# Patient Record
Sex: FEMALE | Race: WHITE | NOT HISPANIC OR LATINO | Employment: UNEMPLOYED | URBAN - NONMETROPOLITAN AREA
[De-identification: names, ages, dates, MRNs, and addresses within clinical notes are randomized per-mention and may not be internally consistent; named-entity substitution may affect disease eponyms.]

---

## 2017-02-22 ENCOUNTER — LAB REQUISITION (OUTPATIENT)
Dept: LAB | Facility: HOSPITAL | Age: 37
End: 2017-02-22

## 2017-02-22 DIAGNOSIS — N28.9 DISORDER OF KIDNEY AND URETER: ICD-10-CM

## 2017-02-22 LAB
BACTERIA UR QL AUTO: ABNORMAL /HPF
BILIRUB UR QL STRIP: NEGATIVE
CLARITY UR: CLEAR
COLOR UR: ABNORMAL
GLUCOSE UR STRIP-MCNC: NEGATIVE MG/DL
HGB UR QL STRIP.AUTO: NEGATIVE
HYALINE CASTS UR QL AUTO: ABNORMAL /LPF
KETONES UR QL STRIP: ABNORMAL
LEUKOCYTE ESTERASE UR QL STRIP.AUTO: NEGATIVE
NITRITE UR QL STRIP: POSITIVE
PH UR STRIP.AUTO: <=5 [PH] (ref 5–8)
PROT UR QL STRIP: NEGATIVE
RBC # UR: ABNORMAL /HPF
REF LAB TEST METHOD: ABNORMAL
SP GR UR STRIP: >=1.03 (ref 1–1.03)
SQUAMOUS #/AREA URNS HPF: ABNORMAL /HPF
UROBILINOGEN UR QL STRIP: ABNORMAL
WBC UR QL AUTO: ABNORMAL /HPF

## 2017-02-22 PROCEDURE — 87086 URINE CULTURE/COLONY COUNT: CPT | Performed by: NURSE PRACTITIONER

## 2017-02-22 PROCEDURE — 81001 URINALYSIS AUTO W/SCOPE: CPT | Performed by: NURSE PRACTITIONER

## 2017-02-24 LAB — BACTERIA SPEC AEROBE CULT: NORMAL

## 2017-05-10 ENCOUNTER — TELEPHONE (OUTPATIENT)
Dept: CARDIOLOGY | Facility: CLINIC | Age: 37
End: 2017-05-10

## 2017-05-15 ENCOUNTER — OFFICE VISIT (OUTPATIENT)
Dept: CARDIOLOGY | Facility: CLINIC | Age: 37
End: 2017-05-15

## 2017-05-15 VITALS
SYSTOLIC BLOOD PRESSURE: 114 MMHG | HEART RATE: 79 BPM | HEIGHT: 65 IN | OXYGEN SATURATION: 97 % | BODY MASS INDEX: 27.06 KG/M2 | WEIGHT: 162.4 LBS | DIASTOLIC BLOOD PRESSURE: 79 MMHG

## 2017-05-15 DIAGNOSIS — R07.9 CHEST PAIN IN ADULT: Primary | ICD-10-CM

## 2017-05-15 DIAGNOSIS — R00.2 PALPITATIONS: ICD-10-CM

## 2017-05-15 PROCEDURE — 93000 ELECTROCARDIOGRAM COMPLETE: CPT | Performed by: INTERNAL MEDICINE

## 2017-05-15 PROCEDURE — 99244 OFF/OP CNSLTJ NEW/EST MOD 40: CPT | Performed by: INTERNAL MEDICINE

## 2017-05-15 RX ORDER — HYDROCODONE BITARTRATE AND ACETAMINOPHEN 10; 325 MG/1; MG/1
TABLET ORAL
Refills: 0 | COMMUNITY
Start: 2017-05-08

## 2017-05-20 PROBLEM — R07.9 CHEST PAIN IN ADULT: Status: ACTIVE | Noted: 2017-05-20

## 2017-05-20 PROBLEM — R00.2 PALPITATIONS: Status: ACTIVE | Noted: 2017-05-20

## 2018-08-13 ENCOUNTER — HOSPITAL ENCOUNTER (EMERGENCY)
Facility: HOSPITAL | Age: 38
Discharge: HOME | End: 2018-08-13
Attending: FAMILY MEDICINE | Admitting: FAMILY MEDICINE

## 2018-08-13 ENCOUNTER — APPOINTMENT (OUTPATIENT)
Dept: GENERAL RADIOLOGY | Facility: HOSPITAL | Age: 38
End: 2018-08-13

## 2018-08-13 VITALS
HEART RATE: 67 BPM | DIASTOLIC BLOOD PRESSURE: 87 MMHG | SYSTOLIC BLOOD PRESSURE: 120 MMHG | BODY MASS INDEX: 25.49 KG/M2 | HEIGHT: 65 IN | RESPIRATION RATE: 16 BRPM | OXYGEN SATURATION: 99 % | WEIGHT: 153 LBS | TEMPERATURE: 98.2 F

## 2018-08-13 DIAGNOSIS — R07.9 CHEST PAIN IN ADULT: Primary | ICD-10-CM

## 2018-08-13 LAB
ALBUMIN SERPL-MCNC: 4.4 G/DL (ref 3.5–5)
ALBUMIN/GLOB SERPL: 1.6 G/DL (ref 1.5–2.5)
ALP SERPL-CCNC: 79 U/L (ref 35–104)
ALT SERPL W P-5'-P-CCNC: 25 U/L (ref 10–36)
ANION GAP SERPL CALCULATED.3IONS-SCNC: 3.6 MMOL/L (ref 3.6–11.2)
AST SERPL-CCNC: 27 U/L (ref 10–30)
BASOPHILS # BLD AUTO: 0.02 10*3/MM3 (ref 0–0.3)
BASOPHILS NFR BLD AUTO: 0.5 % (ref 0–2)
BILIRUB SERPL-MCNC: 0.5 MG/DL (ref 0.2–1.8)
BNP SERPL-MCNC: 20 PG/ML (ref 0–100)
BUN BLD-MCNC: 13 MG/DL (ref 7–21)
BUN/CREAT SERPL: 19.4 (ref 7–25)
CALCIUM SPEC-SCNC: 9.2 MG/DL (ref 7.7–10)
CHLORIDE SERPL-SCNC: 105 MMOL/L (ref 99–112)
CO2 SERPL-SCNC: 30.4 MMOL/L (ref 24.3–31.9)
CREAT BLD-MCNC: 0.67 MG/DL (ref 0.43–1.29)
DEPRECATED RDW RBC AUTO: 41.4 FL (ref 37–54)
EOSINOPHIL # BLD AUTO: 0.08 10*3/MM3 (ref 0–0.7)
EOSINOPHIL NFR BLD AUTO: 1.8 % (ref 0–5)
ERYTHROCYTE [DISTWIDTH] IN BLOOD BY AUTOMATED COUNT: 12.1 % (ref 11.5–14.5)
GFR SERPL CREATININE-BSD FRML MDRD: 97 ML/MIN/1.73
GLOBULIN UR ELPH-MCNC: 2.7 GM/DL
GLUCOSE BLD-MCNC: 93 MG/DL (ref 70–110)
HCT VFR BLD AUTO: 38.5 % (ref 37–47)
HGB BLD-MCNC: 12.7 G/DL (ref 12–16)
IMM GRANULOCYTES # BLD: 0.01 10*3/MM3 (ref 0–0.03)
IMM GRANULOCYTES NFR BLD: 0.2 % (ref 0–0.5)
LYMPHOCYTES # BLD AUTO: 1.58 10*3/MM3 (ref 1–3)
LYMPHOCYTES NFR BLD AUTO: 35.8 % (ref 21–51)
MCH RBC QN AUTO: 31.8 PG (ref 27–33)
MCHC RBC AUTO-ENTMCNC: 33 G/DL (ref 33–37)
MCV RBC AUTO: 96.3 FL (ref 80–94)
MONOCYTES # BLD AUTO: 0.38 10*3/MM3 (ref 0.1–0.9)
MONOCYTES NFR BLD AUTO: 8.6 % (ref 0–10)
NEUTROPHILS # BLD AUTO: 2.34 10*3/MM3 (ref 1.4–6.5)
NEUTROPHILS NFR BLD AUTO: 53.1 % (ref 30–70)
OSMOLALITY SERPL CALC.SUM OF ELEC: 277.3 MOSM/KG (ref 273–305)
PLATELET # BLD AUTO: 183 10*3/MM3 (ref 130–400)
PMV BLD AUTO: 9.7 FL (ref 6–10)
POTASSIUM BLD-SCNC: 3.7 MMOL/L (ref 3.5–5.3)
PROT SERPL-MCNC: 7.1 G/DL (ref 6–8)
RBC # BLD AUTO: 4 10*6/MM3 (ref 4.2–5.4)
SODIUM BLD-SCNC: 139 MMOL/L (ref 135–153)
TROPONIN I SERPL-MCNC: <0.006 NG/ML
TROPONIN I SERPL-MCNC: <0.006 NG/ML
WBC NRBC COR # BLD: 4.41 10*3/MM3 (ref 4.5–12.5)

## 2018-08-13 PROCEDURE — 71045 X-RAY EXAM CHEST 1 VIEW: CPT | Performed by: RADIOLOGY

## 2018-08-13 PROCEDURE — 99285 EMERGENCY DEPT VISIT HI MDM: CPT

## 2018-08-13 PROCEDURE — 83880 ASSAY OF NATRIURETIC PEPTIDE: CPT | Performed by: FAMILY MEDICINE

## 2018-08-13 PROCEDURE — 80053 COMPREHEN METABOLIC PANEL: CPT | Performed by: FAMILY MEDICINE

## 2018-08-13 PROCEDURE — 93005 ELECTROCARDIOGRAM TRACING: CPT | Performed by: FAMILY MEDICINE

## 2018-08-13 PROCEDURE — 84484 ASSAY OF TROPONIN QUANT: CPT | Performed by: FAMILY MEDICINE

## 2018-08-13 PROCEDURE — 85025 COMPLETE CBC W/AUTO DIFF WBC: CPT | Performed by: FAMILY MEDICINE

## 2018-08-13 PROCEDURE — 71045 X-RAY EXAM CHEST 1 VIEW: CPT

## 2018-08-13 PROCEDURE — 36415 COLL VENOUS BLD VENIPUNCTURE: CPT

## 2018-08-13 RX ORDER — SODIUM CHLORIDE 0.9 % (FLUSH) 0.9 %
10 SYRINGE (ML) INJECTION AS NEEDED
Status: DISCONTINUED | OUTPATIENT
Start: 2018-08-13 | End: 2018-08-13 | Stop reason: HOSPADM

## 2018-08-13 RX ORDER — MECLIZINE HCL 12.5 MG/1
25 TABLET ORAL ONCE
Status: COMPLETED | OUTPATIENT
Start: 2018-08-13 | End: 2018-08-13

## 2018-08-13 RX ADMIN — MECLIZINE HCL 25 MG: 12.5 TABLET ORAL at 19:50

## 2018-08-13 RX ADMIN — SODIUM CHLORIDE 1000 ML: 9 INJECTION, SOLUTION INTRAVENOUS at 19:52

## 2018-08-13 NOTE — ED PROVIDER NOTES
Subjective   History of Present Illness  39 y/o F here w/ CP of acute onset. CP is worse on L side, nonexertional, and radiates to L shoulder. Pt with no fever/chills. No SOB. No N/V/D. No h/o previous MI or CAD.   Review of Systems   Constitutional: Negative for chills, fatigue and fever.   Eyes: Negative for photophobia and visual disturbance.   Respiratory: Negative for cough, choking, chest tightness, shortness of breath and wheezing.    Cardiovascular: Positive for chest pain. Negative for palpitations.   Gastrointestinal: Negative for abdominal distention and abdominal pain.   Genitourinary: Negative for difficulty urinating and dysuria.   Musculoskeletal: Negative for back pain and neck pain.   Skin: Negative for color change and pallor.   Neurological: Negative for seizures and headaches.   Hematological: Does not bruise/bleed easily.   All other systems reviewed and are negative.      Past Medical History:   Diagnosis Date   • IC (interstitial cystitis)    • Renal disorder        Allergies   Allergen Reactions   • Augmentin [Amoxicillin-Pot Clavulanate] Unknown (See Comments)     Joint pain     • Ciprofibrate Unknown (See Comments)     Joint pain     • Gentamycin [Gentamicin] Unknown (See Comments)     Joint pain     • Levaquin [Levofloxacin] Unknown (See Comments)     Joint pain         Past Surgical History:   Procedure Laterality Date   • BLADDER SURGERY     • HYSTERECTOMY     • TUBAL ABDOMINAL LIGATION         Family History   Problem Relation Age of Onset   • Heart failure Father        Social History     Social History   • Marital status:      Social History Main Topics   • Smoking status: Never Smoker   • Alcohol use No   • Drug use: No     Other Topics Concern   • Not on file           Objective   Physical Exam   Constitutional: She is oriented to person, place, and time. She appears well-developed and well-nourished. She is active.   HENT:   Head: Normocephalic and atraumatic.   Right Ear:  Hearing, external ear and ear canal normal.   Left Ear: Hearing, external ear and ear canal normal.   Nose: Nose normal.   Mouth/Throat: Uvula is midline, oropharynx is clear and moist and mucous membranes are normal.   Eyes: Pupils are equal, round, and reactive to light. Conjunctivae, EOM and lids are normal.   Neck: Trachea normal, normal range of motion, full passive range of motion without pain and phonation normal. Neck supple.   Cardiovascular: Normal rate, regular rhythm and normal heart sounds.    Pulmonary/Chest: Effort normal and breath sounds normal.   Abdominal: Soft. Normal appearance.   Neurological: She is alert and oriented to person, place, and time. GCS eye subscore is 4. GCS verbal subscore is 5. GCS motor subscore is 6.   Skin: Skin is warm, dry and intact. Capillary refill takes less than 2 seconds.   Psychiatric: She has a normal mood and affect. Her speech is normal and behavior is normal. Cognition and memory are normal.   Nursing note and vitals reviewed.      Procedures           ED Course  ED Course as of Aug 13 2108   Mon Aug 13, 2018   1723 NSR, 76 bpm. QTc 436ms. No ST segment abnormalities concerning for STEMI. ECG 12 Lead [BR]      ED Course User Index  [BR] Yariel Mccollum MD      HEART score of 3. No CP on d/c home. Pt given referral for outpt stress test.            MDM  Number of Diagnoses or Management Options  Chest pain in adult: new and requires workup     Amount and/or Complexity of Data Reviewed  Clinical lab tests: reviewed and ordered  Tests in the radiology section of CPT®: reviewed and ordered  Tests in the medicine section of CPT®: reviewed and ordered  Independent visualization of images, tracings, or specimens: yes    Risk of Complications, Morbidity, and/or Mortality  Presenting problems: high  Diagnostic procedures: high  Management options: high    Patient Progress  Patient progress: stable        Final diagnoses:   Chest pain in adult            Chun  Yariel Sharma MD  08/13/18 1944

## 2018-08-14 ENCOUNTER — TRANSCRIBE ORDERS (OUTPATIENT)
Dept: ADMINISTRATIVE | Facility: HOSPITAL | Age: 38
End: 2018-08-14

## 2018-08-14 DIAGNOSIS — R07.9 CHEST PAIN, UNSPECIFIED TYPE: Primary | ICD-10-CM

## 2018-08-14 NOTE — ED NOTES
Report received from Sudhakar Menjivar RN. Pt is resting on stretcher at this time A&XO4, RR even and unlabored, skin WPD. Pt denies chest pain at this time and complains of right shoulder pain/ pain with breathing. Pt states that she now feels lightheaded and dizzy. Dr. Mccollum made aware of Pt new complaints. Pt vitals WNL. Pt noted to be NS on the monitor with HR 65. Pt family at bedside. Pt does not appear in any acute distress. WCTM. Safety measures remain WDL.      Suzette Duenas, AYE  08/13/18 2000

## 2018-08-14 NOTE — ED NOTES
Pt left the ED with stress test order form in hand and verbalized understanding of follow up instructions.      Suzette Duenas RN  08/13/18 0996

## 2018-08-15 ENCOUNTER — HOSPITAL ENCOUNTER (OUTPATIENT)
Dept: CARDIOLOGY | Facility: HOSPITAL | Age: 38
Discharge: HOME | End: 2018-08-15
Attending: FAMILY MEDICINE | Admitting: FAMILY MEDICINE

## 2018-08-15 DIAGNOSIS — R07.9 CHEST PAIN, UNSPECIFIED TYPE: ICD-10-CM

## 2018-08-15 PROCEDURE — 93018 CV STRESS TEST I&R ONLY: CPT | Performed by: INTERNAL MEDICINE

## 2018-08-15 PROCEDURE — 93017 CV STRESS TEST TRACING ONLY: CPT

## 2018-08-16 LAB
BH CV STRESS BP STAGE 1: NORMAL
BH CV STRESS BP STAGE 2: NORMAL
BH CV STRESS BP STAGE 3: NORMAL
BH CV STRESS BP STAGE 4: NORMAL
BH CV STRESS DURATION MIN STAGE 1: 3
BH CV STRESS DURATION MIN STAGE 2: 3
BH CV STRESS DURATION MIN STAGE 3: 3
BH CV STRESS DURATION MIN STAGE 4: 3
BH CV STRESS DURATION SEC STAGE 1: 0
BH CV STRESS DURATION SEC STAGE 2: 0
BH CV STRESS DURATION SEC STAGE 3: 0
BH CV STRESS DURATION SEC STAGE 4: 0
BH CV STRESS GRADE STAGE 1: 10
BH CV STRESS GRADE STAGE 2: 12
BH CV STRESS GRADE STAGE 3: 14
BH CV STRESS GRADE STAGE 4: 16
BH CV STRESS HR STAGE 1: 97
BH CV STRESS HR STAGE 2: 110
BH CV STRESS HR STAGE 3: 138
BH CV STRESS HR STAGE 4: 102
BH CV STRESS METS STAGE 1: 5
BH CV STRESS METS STAGE 2: 7.5
BH CV STRESS METS STAGE 3: 10
BH CV STRESS METS STAGE 4: 13.5
BH CV STRESS PROTOCOL 1: NORMAL
BH CV STRESS RECOVERY BP: NORMAL MMHG
BH CV STRESS RECOVERY HR: 78 BPM
BH CV STRESS SPEED STAGE 1: 1.7
BH CV STRESS SPEED STAGE 2: 2.5
BH CV STRESS SPEED STAGE 3: 3.4
BH CV STRESS SPEED STAGE 4: 4.2
BH CV STRESS STAGE 1: 1
BH CV STRESS STAGE 2: 2
BH CV STRESS STAGE 3: 3
BH CV STRESS STAGE 4: 4
MAXIMAL PREDICTED HEART RATE: 180 BPM
PERCENT MAX PREDICTED HR: 76.67 %
STRESS BASELINE BP: NORMAL MMHG
STRESS BASELINE HR: 86 BPM
STRESS PERCENT HR: 90 %
STRESS POST ESTIMATED WORKLOAD: 10.1 METS
STRESS POST EXERCISE DUR MIN: 9 MIN
STRESS POST EXERCISE DUR SEC: 40 SEC
STRESS POST PEAK BP: NORMAL MMHG
STRESS POST PEAK HR: 138 BPM
STRESS TARGET HR: 153 BPM

## 2018-08-22 ENCOUNTER — OFFICE VISIT (OUTPATIENT)
Dept: CARDIOLOGY | Facility: CLINIC | Age: 38
End: 2018-08-22

## 2018-08-22 VITALS
HEIGHT: 65 IN | BODY MASS INDEX: 26.39 KG/M2 | DIASTOLIC BLOOD PRESSURE: 87 MMHG | HEART RATE: 68 BPM | SYSTOLIC BLOOD PRESSURE: 125 MMHG | WEIGHT: 158.4 LBS

## 2018-08-22 DIAGNOSIS — R07.9 CHEST PAIN AT REST: Primary | ICD-10-CM

## 2018-08-22 PROCEDURE — 99214 OFFICE O/P EST MOD 30 MIN: CPT | Performed by: INTERNAL MEDICINE

## 2018-08-22 RX ORDER — FLUCONAZOLE 150 MG/1
150 TABLET ORAL DAILY
COMMUNITY

## 2018-08-22 NOTE — PROGRESS NOTES
[facility] CARDIOLOGY  [address]  [place] [address]  Phone: [phone]  Fax: [phone]    08/22/2018    Chief Complaint   Patient presents with   • Chest Pain        History:   Kalyn Mcmahon is a 38 y.o. female seen in followup, for evaluation of chest pain, and chest pressure, usually happens almost every day, anterior precordium, radiating to back and to her arms, also has a pressure like feeling on top of her chest pain, her pain usually starts with rest and worsens on exertion, last for many hours, and resolves spontaneously or with heat pad. Her pain happens almost every day. Also associated with dizziness, palpitations and dyspnea. Because of her family hx of premature CAD she gets anxious about her chest pain and that worsens it more. She did present to the hospital with similar chest pain few weeks ago, her EKG then showed sinus rhythm with no sig ST-T changes, her cardiac biomarkers were normal, her BNP was normal, she had a treadmill stress test, she did for almost 10 METS and had no EKG changes for ischemia and she said no chest pain on the treadmill. All these are very reassuring. Today in the office she is not having any chest pain, her vitals were normal.     She does have hx of cervical cx and had hysterectomy.   Also has congenital renal disorder and Interstitial cystis, her kidney function test were normal.     No lower extremity edema, denies any syncope.     Past Medical History:   Diagnosis Date   • IC (interstitial cystitis)    • Renal disorder        Past Surgical History:   Procedure Laterality Date   • BLADDER SURGERY     • HYSTERECTOMY     • TUBAL ABDOMINAL LIGATION          Past Social History:  Social History     Social History   • Marital status:      Social History Main Topics   • Smoking status: Never Smoker   • Alcohol use No   • Drug use: No     Other Topics Concern   • Not on file       Past Family History:  Family History   Problem  Relation Age of Onset   • Heart failure Father     Family hx of premature CAD - Dad MI  1 ST Cousin had MI    Review of Systems:   Review of Systems   Constitution: Negative for diaphoresis, fever, weakness, weight gain and weight loss.   HENT: Negative for congestion, nosebleeds and sore throat.    Eyes: Negative for blurred vision and visual disturbance.   Cardiovascular: Negative for chest pain, claudication, dyspnea on exertion, irregular heartbeat, leg swelling, orthopnea, palpitations, paroxysmal nocturnal dyspnea and syncope.   Respiratory: Negative for cough, hemoptysis, shortness of breath, sleep disturbances due to breathing, snoring, sputum production and wheezing.    Endocrine: Negative for cold intolerance, heat intolerance, polydipsia, polyphagia and polyuria.   Hematologic/Lymphatic: Negative for bleeding problem.   Skin: Negative for dry skin, itching and rash.   Musculoskeletal: Negative for muscle cramps, muscle weakness and myalgias.   Gastrointestinal: Negative for abdominal pain, constipation, diarrhea, heartburn, hematemesis, hematochezia, hemorrhoids, melena, nausea and vomiting.   Genitourinary: Negative for hematuria and nocturia.   Neurological: Negative for excessive daytime sleepiness, dizziness, focal weakness, headaches, light-headedness, numbness, seizures and vertigo.   Psychiatric/Behavioral: Negative for depression, substance abuse and suicidal ideas.   Allergic/Immunologic: Negative for environmental allergies.         Current Outpatient Prescriptions   Medication Sig Dispense Refill   • estradiol (CLIMARA) 0.1 MG/24HR patch Place 1 patch on the skin as directed by provider 1 (One) Time Per Week.     • fluconazole (DIFLUCAN) 150 MG tablet Take 150 mg by mouth Daily.     • HYDROcodone-acetaminophen (NORCO)  MG per tablet 3x daily  0   • terconazole (TERAZOL 3) 0.8 % vaginal cream Insert 1 applicator into the vagina Every Night.     • BIOTIN PO Take  by mouth.     • Cetirizine  "HCl (ZYRTEC ALLERGY PO) Take  by mouth.     • Magnesium 100 MG capsule Take  by mouth.     • Potassium (POTASSIMIN PO) Take  by mouth.     • Probiotic Product (PROBIOTIC DAILY PO) Take  by mouth.       No current facility-administered medications for this visit.         Allergies   Allergen Reactions   • Augmentin [Amoxicillin-Pot Clavulanate] Unknown (See Comments)     Joint pain     • Ciprofibrate Unknown (See Comments)     Joint pain     • Gentamycin [Gentamicin] Unknown (See Comments)     Joint pain     • Levaquin [Levofloxacin] Unknown (See Comments)     Joint pain         Objective     /87   Pulse 68   Ht 165.1 cm (65\")   Wt 71.8 kg (158 lb 6.4 oz)   BMI 26.36 kg/m²     Physical Exam   Constitutional: She appears well-developed and well-nourished.   HENT:   Head: Normocephalic and atraumatic.   Eyes: Pupils are equal, round, and reactive to light.   Neck: Normal range of motion. Neck supple.   Cardiovascular: Normal rate and regular rhythm.    No murmur heard.  Pulmonary/Chest: Effort normal and breath sounds normal.   Abdominal: Soft. Bowel sounds are normal.   Musculoskeletal: Normal range of motion.   Skin: Skin is warm and dry. Capillary refill takes less than 2 seconds.   Psychiatric: Her behavior is normal.       DATA:        Results for orders placed during the hospital encounter of 08/15/18   Treadmill Stress Test    Narrative · A stress test was performed following the Marcos protocol.  · Pt walked on treadmill for 9 minutes and 40 secs achieving target heart   rate. Pt had episode of chest discomfort during testing which resolved   quickly. Testing stopped due to dyspnea and fatigue. Normal recovery   otherwise and stable on discharge  · Baseline ECG of normal sinus rhythm noted. Normal baseline ECG noted.   There was no ST segment deviation noted.  · Stress ECG rhythm of sinus tachycardia noted. Normal ECG with no   significant stress induced changes noted.  · No ECG evidence of myocardial " ischemia.Negative clinical evidence of   myocardial ischemia. Findings consistent with a normal ECG stress test.         Results for orders placed during the hospital encounter of 08/15/18   Treadmill Stress Test    Narrative · A stress test was performed following the Marcos protocol.  · Pt walked on treadmill for 9 minutes and 40 secs achieving target heart   rate. Pt had episode of chest discomfort during testing which resolved   quickly. Testing stopped due to dyspnea and fatigue. Normal recovery   otherwise and stable on discharge  · Baseline ECG of normal sinus rhythm noted. Normal baseline ECG noted.   There was no ST segment deviation noted.  · Stress ECG rhythm of sinus tachycardia noted. Normal ECG with no   significant stress induced changes noted.  · No ECG evidence of myocardial ischemia.Negative clinical evidence of   myocardial ischemia. Findings consistent with a normal ECG stress test.          Procedures             Assessment:  Chest pain, atypical  Palpitations - associated with chest pain and anxiety  Hx of renal disorder  Intestitial cystitis      Plan:  I did re assure her that her chest pain his atypical and since she had a normal treadmill stress test it is unlikely from her CAD, but since she has significant family hx she was worried about her recurrent ongoing chest pain. I recommended getting a CT coronary angiogram for further evaluation of her chest pain, also will get a 2 D Echo to look for structural heart abnormalities as she does have an incomplete RBBB pattern to her EKG.   F/u in 4 weeks.       Recommended increase activity to 30 minutes of walking daily, most days of the week.    Patient's Body mass index is 26.36 kg/m². BMI is above normal parameters. Recommendations include: educational material, exercise counseling and nutrition counseling.          Thank you for allowing me to participate in the care of Kalyn Mcmahon. Feel free to contact me directly with any further  questions or concerns.          Danny Zapien MD, FACC  Interventional Cardiology

## 2018-08-24 DIAGNOSIS — R07.9 CHEST PAIN AT REST: ICD-10-CM

## 2018-08-30 ENCOUNTER — HOSPITAL ENCOUNTER (OUTPATIENT)
Dept: CARDIOLOGY | Facility: HOSPITAL | Age: 38
Discharge: HOME | End: 2018-08-30
Attending: INTERNAL MEDICINE | Admitting: INTERNAL MEDICINE

## 2018-08-30 PROCEDURE — 93306 TTE W/DOPPLER COMPLETE: CPT

## 2018-08-30 PROCEDURE — 93306 TTE W/DOPPLER COMPLETE: CPT | Performed by: INTERNAL MEDICINE

## 2018-09-04 LAB
BH CV ECHO MEAS - % IVS THICK: 2.2 %
BH CV ECHO MEAS - % LVPW THICK: 29.1 %
BH CV ECHO MEAS - ACS: 2.1 CM
BH CV ECHO MEAS - AO MAX PG: 5.8 MMHG
BH CV ECHO MEAS - AO MEAN PG: 3.2 MMHG
BH CV ECHO MEAS - AO ROOT AREA (BSA CORRECTED): 1.6
BH CV ECHO MEAS - AO ROOT AREA: 6.3 CM^2
BH CV ECHO MEAS - AO ROOT DIAM: 2.8 CM
BH CV ECHO MEAS - AO V2 MAX: 120.8 CM/SEC
BH CV ECHO MEAS - AO V2 MEAN: 83.8 CM/SEC
BH CV ECHO MEAS - AO V2 VTI: 28.3 CM
BH CV ECHO MEAS - BSA(HAYCOCK): 1.8 M^2
BH CV ECHO MEAS - BSA: 1.8 M^2
BH CV ECHO MEAS - BZI_BMI: 26.3 KILOGRAMS/M^2
BH CV ECHO MEAS - BZI_METRIC_HEIGHT: 165.1 CM
BH CV ECHO MEAS - BZI_METRIC_WEIGHT: 71.7 KG
BH CV ECHO MEAS - EDV(CUBED): 59.7 ML
BH CV ECHO MEAS - EDV(MOD-SP4): 49 ML
BH CV ECHO MEAS - EDV(TEICH): 66.2 ML
BH CV ECHO MEAS - EF(CUBED): 68.5 %
BH CV ECHO MEAS - EF(MOD-SP4): 63.3 %
BH CV ECHO MEAS - EF(TEICH): 60.7 %
BH CV ECHO MEAS - ESV(CUBED): 18.8 ML
BH CV ECHO MEAS - ESV(MOD-SP4): 18 ML
BH CV ECHO MEAS - ESV(TEICH): 26 ML
BH CV ECHO MEAS - FS: 32 %
BH CV ECHO MEAS - IVS/LVPW: 1
BH CV ECHO MEAS - IVSD: 1 CM
BH CV ECHO MEAS - IVSS: 1 CM
BH CV ECHO MEAS - LA DIMENSION: 3.2 CM
BH CV ECHO MEAS - LA/AO: 1.1
BH CV ECHO MEAS - LV DIASTOLIC VOL/BSA (35-75): 27.4 ML/M^2
BH CV ECHO MEAS - LV MASS(C)D: 120.5 GRAMS
BH CV ECHO MEAS - LV MASS(C)DI: 67.3 GRAMS/M^2
BH CV ECHO MEAS - LV MASS(C)S: 85.4 GRAMS
BH CV ECHO MEAS - LV MASS(C)SI: 47.7 GRAMS/M^2
BH CV ECHO MEAS - LV SYSTOLIC VOL/BSA (12-30): 10.1 ML/M^2
BH CV ECHO MEAS - LVIDD: 3.9 CM
BH CV ECHO MEAS - LVIDS: 2.7 CM
BH CV ECHO MEAS - LVLD AP4: 5.9 CM
BH CV ECHO MEAS - LVLS AP4: 4.6 CM
BH CV ECHO MEAS - LVOT AREA (M): 2.8 CM^2
BH CV ECHO MEAS - LVOT AREA: 2.8 CM^2
BH CV ECHO MEAS - LVOT DIAM: 1.9 CM
BH CV ECHO MEAS - LVPWD: 0.97 CM
BH CV ECHO MEAS - LVPWS: 1.3 CM
BH CV ECHO MEAS - MV A MAX VEL: 47.9 CM/SEC
BH CV ECHO MEAS - MV E MAX VEL: 76 CM/SEC
BH CV ECHO MEAS - MV E/A: 1.6
BH CV ECHO MEAS - PA ACC SLOPE: 843.9 CM/SEC^2
BH CV ECHO MEAS - PA ACC TIME: 0.13 SEC
BH CV ECHO MEAS - PA PR(ACCEL): 20.4 MMHG
BH CV ECHO MEAS - RAP SYSTOLE: 10 MMHG
BH CV ECHO MEAS - RVDD: 2 CM
BH CV ECHO MEAS - RVSP: 38.6 MMHG
BH CV ECHO MEAS - SI(AO): 99.4 ML/M^2
BH CV ECHO MEAS - SI(CUBED): 22.8 ML/M^2
BH CV ECHO MEAS - SI(MOD-SP4): 17.3 ML/M^2
BH CV ECHO MEAS - SI(TEICH): 22.5 ML/M^2
BH CV ECHO MEAS - SV(AO): 177.8 ML
BH CV ECHO MEAS - SV(CUBED): 40.9 ML
BH CV ECHO MEAS - SV(MOD-SP4): 31 ML
BH CV ECHO MEAS - SV(TEICH): 40.2 ML
BH CV ECHO MEAS - TR MAX VEL: 267.4 CM/SEC
MAXIMAL PREDICTED HEART RATE: 180 BPM
STRESS TARGET HR: 153 BPM

## 2018-09-06 ENCOUNTER — HOSPITAL ENCOUNTER (OUTPATIENT)
Dept: CT IMAGING | Facility: HOSPITAL | Age: 38
End: 2018-09-06
Attending: INTERNAL MEDICINE

## 2018-09-11 ENCOUNTER — TELEPHONE (OUTPATIENT)
Dept: CARDIOLOGY | Facility: CLINIC | Age: 38
End: 2018-09-11

## 2018-09-11 NOTE — TELEPHONE ENCOUNTER
----- Message from Danny Zapien MD sent at 9/10/2018  3:57 PM EDT -----  Regarding: RE: RESULTS  It was normal. Can you check what happened CT angio I ordered.  ----- Message -----  From: Dolores Coello CMA  Sent: 9/10/2018   3:15 PM  To: Danny Zapien MD  Subject: RESULTS                                          Can I get Kalyn's echocardiogram results? I will call her to let her know.    Thanks!  Hope

## 2018-09-13 ENCOUNTER — HOSPITAL ENCOUNTER (OUTPATIENT)
Dept: CT IMAGING | Facility: HOSPITAL | Age: 38
End: 2018-09-13
Attending: INTERNAL MEDICINE

## 2018-09-20 ENCOUNTER — HOSPITAL ENCOUNTER (OUTPATIENT)
Dept: CT IMAGING | Facility: HOSPITAL | Age: 38
Discharge: HOME | End: 2018-09-20
Attending: INTERNAL MEDICINE | Admitting: RADIOLOGY

## 2018-09-20 VITALS
RESPIRATION RATE: 16 BRPM | DIASTOLIC BLOOD PRESSURE: 72 MMHG | HEIGHT: 65 IN | BODY MASS INDEX: 25.49 KG/M2 | OXYGEN SATURATION: 98 % | HEART RATE: 71 BPM | WEIGHT: 153 LBS | SYSTOLIC BLOOD PRESSURE: 103 MMHG

## 2018-09-20 PROCEDURE — 75574 CT ANGIO HRT W/3D IMAGE: CPT

## 2018-09-20 PROCEDURE — 75574 CT ANGIO HRT W/3D IMAGE: CPT | Performed by: RADIOLOGY

## 2018-09-20 PROCEDURE — 0 IOPAMIDOL PER 1 ML: Performed by: INTERNAL MEDICINE

## 2018-09-20 PROCEDURE — 82565 ASSAY OF CREATININE: CPT

## 2018-09-20 RX ORDER — METOPROLOL TARTRATE 100 MG/1
100 TABLET ORAL ONCE AS NEEDED
Status: DISCONTINUED | OUTPATIENT
Start: 2018-09-20 | End: 2018-09-21 | Stop reason: HOSPADM

## 2018-09-20 RX ORDER — METOPROLOL TARTRATE 50 MG/1
50 TABLET, FILM COATED ORAL ONCE AS NEEDED
Status: DISCONTINUED | OUTPATIENT
Start: 2018-09-20 | End: 2018-09-21 | Stop reason: HOSPADM

## 2018-09-20 RX ORDER — SODIUM CHLORIDE 0.9 % (FLUSH) 0.9 %
1-10 SYRINGE (ML) INJECTION AS NEEDED
Status: DISCONTINUED | OUTPATIENT
Start: 2018-09-20 | End: 2018-09-21 | Stop reason: HOSPADM

## 2018-09-20 RX ORDER — NITROGLYCERIN 0.4 MG/1
0.4 TABLET SUBLINGUAL
Status: COMPLETED | OUTPATIENT
Start: 2018-09-20 | End: 2018-09-20

## 2018-09-20 RX ADMIN — NITROGLYCERIN 0.4 MG: 0.4 TABLET SUBLINGUAL at 10:14

## 2018-09-20 RX ADMIN — IOPAMIDOL 50 ML: 755 INJECTION, SOLUTION INTRAVENOUS at 11:08

## 2018-09-21 LAB — CREAT BLDA-MCNC: 0.8 MG/DL (ref 0.6–1.3)

## 2018-09-24 ENCOUNTER — TELEPHONE (OUTPATIENT)
Dept: CARDIOLOGY | Facility: CLINIC | Age: 38
End: 2018-09-24

## 2018-09-24 NOTE — TELEPHONE ENCOUNTER
Called patient per Dr. Zapien request, told her echocardiogram and CT Angiogram were both normal. Patient is aware and understands. I have reminded her to keep follow up appointment.

## 2018-09-24 NOTE — TELEPHONE ENCOUNTER
----- Message from Danny Zapien MD sent at 9/22/2018  5:57 PM EDT -----  Regarding: RE: RESULTS  Her Echo and CT Coronary angiogram are normal. You can call and tell her.  ----- Message -----  From: Dolores Coello CMA  Sent: 9/10/2018  12:15 PM  To: Danny Zapien MD  Subject: RESULTS                                          Can I get Kalyn's echocardiogram results? I will call her to let her know.    Thanks!  Hope

## 2018-10-01 ENCOUNTER — OFFICE VISIT (OUTPATIENT)
Dept: CARDIOLOGY | Facility: CLINIC | Age: 38
End: 2018-10-01

## 2018-10-01 VITALS
OXYGEN SATURATION: 98 % | SYSTOLIC BLOOD PRESSURE: 98 MMHG | HEIGHT: 65 IN | WEIGHT: 158.2 LBS | HEART RATE: 72 BPM | BODY MASS INDEX: 26.36 KG/M2 | DIASTOLIC BLOOD PRESSURE: 66 MMHG

## 2018-10-01 DIAGNOSIS — R07.9 CHEST PAIN IN ADULT: ICD-10-CM

## 2018-10-01 DIAGNOSIS — R00.2 PALPITATIONS: Primary | ICD-10-CM

## 2018-10-01 PROCEDURE — 99212 OFFICE O/P EST SF 10 MIN: CPT | Performed by: INTERNAL MEDICINE

## 2018-10-01 NOTE — PROGRESS NOTES
[facility] CARDIOLOGY  [address]  [place] [address]  Phone: [phone]  Fax: [phone]    10/01/2018    Chief Complaint   Patient presents with   • Palpitations   • Coronary Artery Disease        History:   Kalyn Mcmahon is a 38 y.o. female seen in followup, during last visit we did a CT coronary angiogram for recurrent intermitted chest pain and strong family hx, her CTA was negative for CAD. Her symptoms has improved since last visit, I did reassure that it not from heart artery blockages and that seem to have removed much of the anxiety component. I did ask about her palpitations, she has episodes once every month or so, but usually when she gets the chest pain and resolves spontaneously, likely from her anxiety of getting chest pain. No syncope, dizziness, sob. Normal Echo and treadmill from before.     Past Medical History:   Diagnosis Date   • Fibromyalgia    • IC (interstitial cystitis)    • IC (interstitial cystitis)    • Renal disorder        Past Surgical History:   Procedure Laterality Date   • BLADDER SURGERY     • HYSTERECTOMY     • TUBAL ABDOMINAL LIGATION          Past Social History:  Social History     Social History   • Marital status:      Social History Main Topics   • Smoking status: Never Smoker   • Smokeless tobacco: Never Used   • Alcohol use No   • Drug use: No     Other Topics Concern   • Not on file       Past Family History:  Family History   Problem Relation Age of Onset   • Heart failure Father        Review of Systems:   Review of Systems   Constitution: Positive for weakness and malaise/fatigue. Negative for decreased appetite, diaphoresis, fever, weight gain and weight loss.   HENT: Negative for congestion, nosebleeds and sore throat.    Eyes: Negative for blurred vision and double vision.   Cardiovascular: Positive for chest pain and palpitations. Negative for claudication, dyspnea on exertion, irregular heartbeat, leg swelling,  orthopnea, paroxysmal nocturnal dyspnea and syncope.   Respiratory: Negative for cough, hemoptysis, shortness of breath, sleep disturbances due to breathing, snoring, sputum production and wheezing.    Endocrine: Positive for cold intolerance. Negative for heat intolerance, polydipsia, polyphagia and polyuria.   Hematologic/Lymphatic: Negative for bleeding problem.   Skin: Negative for dry skin, itching and rash.   Musculoskeletal: Positive for muscle cramps, muscle weakness and myalgias.   Gastrointestinal: Positive for abdominal pain. Negative for constipation, diarrhea, heartburn, hematemesis, hematochezia, nausea and vomiting.   Neurological: Positive for headaches. Negative for excessive daytime sleepiness, dizziness, focal weakness, light-headedness, numbness, seizures and vertigo.   Psychiatric/Behavioral: Negative for depression, substance abuse and suicidal ideas.   Allergic/Immunologic: Positive for environmental allergies.   All other systems reviewed and are negative.        Current Outpatient Prescriptions   Medication Sig Dispense Refill   • BIOTIN PO Take  by mouth.     • Cetirizine HCl (ZYRTEC ALLERGY PO) Take  by mouth.     • estradiol (CLIMARA) 0.1 MG/24HR patch Place 1 patch on the skin as directed by provider 1 (One) Time Per Week.     • fluconazole (DIFLUCAN) 150 MG tablet Take 150 mg by mouth Daily.     • HYDROcodone-acetaminophen (NORCO)  MG per tablet 3x daily  0   • Magnesium 100 MG capsule Take  by mouth.     • Potassium (POTASSIMIN PO) Take  by mouth.     • Probiotic Product (PROBIOTIC DAILY PO) Take  by mouth.     • terconazole (TERAZOL 3) 0.8 % vaginal cream Insert 1 applicator into the vagina Every Night.       No current facility-administered medications for this visit.         Allergies   Allergen Reactions   • Augmentin [Amoxicillin-Pot Clavulanate] Unknown (See Comments)     Joint pain     • Bactrim [Sulfamethoxazole-Trimethoprim] Other (See Comments)     Headache, weakness,  "body aches   • Ciprofibrate Unknown (See Comments)     Joint pain     • Gentamycin [Gentamicin] Unknown (See Comments)     Joint pain     • Levaquin [Levofloxacin] Unknown (See Comments)     Joint pain         Objective     BP 98/66 (BP Location: Left arm, Patient Position: Sitting)   Pulse 72   Ht 165.1 cm (65\")   Wt 71.8 kg (158 lb 3.2 oz)   SpO2 98%   BMI 26.33 kg/m²     Physical Exam   Constitutional: She is oriented to person, place, and time. She appears well-developed and well-nourished.   HENT:   Head: Normocephalic and atraumatic.   Eyes: Pupils are equal, round, and reactive to light. EOM are normal.   Neck: Normal range of motion. Neck supple.   Cardiovascular: Normal rate, regular rhythm and normal heart sounds.    Pulmonary/Chest: Effort normal and breath sounds normal.   Abdominal: Soft. Bowel sounds are normal.   Neurological: She is alert and oriented to person, place, and time.   Skin: Skin is warm.   Psychiatric: She has a normal mood and affect.       DATA:      Results for orders placed in visit on 08/24/18   Adult Transthoracic Echo Complete W/ Cont if Necessary Per Protocol    Narrative · Left ventricular systolic function is normal. Estimated EF appears to be   in the range of 61 - 65%  · Normal right ventricular cavity size, wall thickness, systolic function   and septal motion noted.  · No significant valvular pathology noted.  · The pericardium is normal. There is no evidence of pericardial effusion  · No previous study for comparison         Results for orders placed during the hospital encounter of 08/15/18   Treadmill Stress Test    Narrative · A stress test was performed following the Marcos protocol.  · Pt walked on treadmill for 9 minutes and 40 secs achieving target heart   rate. Pt had episode of chest discomfort during testing which resolved   quickly. Testing stopped due to dyspnea and fatigue. Normal recovery   otherwise and stable on discharge  · Baseline ECG of normal sinus " rhythm noted. Normal baseline ECG noted.   There was no ST segment deviation noted.  · Stress ECG rhythm of sinus tachycardia noted. Normal ECG with no   significant stress induced changes noted.  · No ECG evidence of myocardial ischemia.Negative clinical evidence of   myocardial ischemia. Findings consistent with a normal ECG stress test.         Results for orders placed during the hospital encounter of 08/15/18   Treadmill Stress Test    Narrative · A stress test was performed following the Marcos protocol.  · Pt walked on treadmill for 9 minutes and 40 secs achieving target heart   rate. Pt had episode of chest discomfort during testing which resolved   quickly. Testing stopped due to dyspnea and fatigue. Normal recovery   otherwise and stable on discharge  · Baseline ECG of normal sinus rhythm noted. Normal baseline ECG noted.   There was no ST segment deviation noted.  · Stress ECG rhythm of sinus tachycardia noted. Normal ECG with no   significant stress induced changes noted.  · No ECG evidence of myocardial ischemia.Negative clinical evidence of   myocardial ischemia. Findings consistent with a normal ECG stress test.          Procedures      Assessment:  1. Chest pain, non cardiac, normal Treadmill stress test, normal CT coronary angiogram, normal LV systolic function and valvular function from Echo  2. Palpitations, likely from anxiety.       Plan:  1. No further cardiac workup needed   2. I explained if she has recurrent palpitations we can arrange for a event recorder , no arrhythmias from treadmill stress test, no structural abnormalities from Echo, likely from anxiety.   3. F/u PRN        Recommended increase activity to 30 minutes of walking daily, most days of the week.        Patient's Body mass index is 26.33 kg/m². BMI is above normal parameters. Recommendations include: educational material.       No Follow-up on file.    Thank you for allowing me to participate in the care of Kalyn Mcmahon.  Feel free to contact me directly with any further questions or concerns.          Danny Zapien MD, FACC  Interventional Cardiology

## 2021-09-08 ENCOUNTER — HOSPITAL ENCOUNTER (OUTPATIENT)
Dept: INFUSION THERAPY | Facility: HOSPITAL | Age: 41
Discharge: HOME | End: 2021-09-08
Admitting: NURSE PRACTITIONER

## 2021-09-08 VITALS
TEMPERATURE: 99.1 F | RESPIRATION RATE: 20 BRPM | SYSTOLIC BLOOD PRESSURE: 121 MMHG | OXYGEN SATURATION: 100 % | HEART RATE: 78 BPM | DIASTOLIC BLOOD PRESSURE: 89 MMHG

## 2021-09-08 DIAGNOSIS — U07.1 COVID-19: Primary | ICD-10-CM

## 2021-09-08 PROCEDURE — M0243 CASIRIVI AND IMDEVI INFUSION: HCPCS | Performed by: NURSE PRACTITIONER

## 2021-09-08 PROCEDURE — 25010000006 INJECTION, CASIRIVIMAB AND IMDEVIMAB, 1200 MG: Performed by: NURSE PRACTITIONER

## 2021-09-08 RX ORDER — DIPHENHYDRAMINE HYDROCHLORIDE 50 MG/ML
50 INJECTION INTRAMUSCULAR; INTRAVENOUS AS NEEDED
Status: CANCELLED | OUTPATIENT
Start: 2021-09-08

## 2021-09-08 RX ORDER — DIPHENHYDRAMINE HYDROCHLORIDE 50 MG/ML
50 INJECTION INTRAMUSCULAR; INTRAVENOUS AS NEEDED
Status: DISCONTINUED | OUTPATIENT
Start: 2021-09-08 | End: 2021-09-10 | Stop reason: HOSPADM

## 2021-09-08 RX ORDER — METHYLPREDNISOLONE SODIUM SUCCINATE 125 MG/2ML
125 INJECTION, POWDER, LYOPHILIZED, FOR SOLUTION INTRAMUSCULAR; INTRAVENOUS AS NEEDED
Status: CANCELLED | OUTPATIENT
Start: 2021-09-08

## 2021-09-08 RX ORDER — EPINEPHRINE 1 MG/ML
0.3 INJECTION, SOLUTION INTRAMUSCULAR; SUBCUTANEOUS AS NEEDED
Status: CANCELLED | OUTPATIENT
Start: 2021-09-08

## 2021-09-08 RX ORDER — EPINEPHRINE 1 MG/ML
0.3 INJECTION, SOLUTION INTRAMUSCULAR; SUBCUTANEOUS AS NEEDED
Status: DISCONTINUED | OUTPATIENT
Start: 2021-09-08 | End: 2021-09-10 | Stop reason: HOSPADM

## 2021-09-08 RX ORDER — METHYLPREDNISOLONE SODIUM SUCCINATE 125 MG/2ML
125 INJECTION, POWDER, LYOPHILIZED, FOR SOLUTION INTRAMUSCULAR; INTRAVENOUS AS NEEDED
Status: DISCONTINUED | OUTPATIENT
Start: 2021-09-08 | End: 2021-09-10 | Stop reason: HOSPADM

## 2021-09-08 RX ADMIN — CASIRIVIMAB AND IMDEVIMAB: 600; 600 INJECTION, SOLUTION, CONCENTRATE INTRAVENOUS at 15:30

## 2021-09-23 ENCOUNTER — APPOINTMENT (OUTPATIENT)
Dept: GENERAL RADIOLOGY | Facility: HOSPITAL | Age: 41
End: 2021-09-23

## 2021-09-23 ENCOUNTER — APPOINTMENT (OUTPATIENT)
Dept: CT IMAGING | Facility: HOSPITAL | Age: 41
End: 2021-09-23

## 2021-09-23 ENCOUNTER — HOSPITAL ENCOUNTER (EMERGENCY)
Facility: HOSPITAL | Age: 41
Discharge: HOME | End: 2021-09-24
Attending: EMERGENCY MEDICINE | Admitting: EMERGENCY MEDICINE

## 2021-09-23 DIAGNOSIS — R07.9 CHEST PAIN, UNSPECIFIED TYPE: Primary | ICD-10-CM

## 2021-09-23 LAB
ALBUMIN SERPL-MCNC: 4.26 G/DL (ref 3.5–5.2)
ALBUMIN/GLOB SERPL: 1.6 G/DL
ALP SERPL-CCNC: 59 U/L (ref 39–117)
ALT SERPL W P-5'-P-CCNC: 51 U/L (ref 1–33)
ANION GAP SERPL CALCULATED.3IONS-SCNC: 10.4 MMOL/L (ref 5–15)
AST SERPL-CCNC: 24 U/L (ref 1–32)
BACTERIA UR QL AUTO: ABNORMAL /HPF
BASOPHILS # BLD AUTO: 0.03 10*3/MM3 (ref 0–0.2)
BASOPHILS NFR BLD AUTO: 0.3 % (ref 0–1.5)
BILIRUB SERPL-MCNC: 0.2 MG/DL (ref 0–1.2)
BILIRUB UR QL STRIP: ABNORMAL
BUN SERPL-MCNC: 15 MG/DL (ref 6–20)
BUN/CREAT SERPL: 12.9 (ref 7–25)
CALCIUM SPEC-SCNC: 9.8 MG/DL (ref 8.6–10.5)
CHLORIDE SERPL-SCNC: 103 MMOL/L (ref 98–107)
CLARITY UR: ABNORMAL
CO2 SERPL-SCNC: 28.6 MMOL/L (ref 22–29)
COLOR UR: ABNORMAL
CREAT SERPL-MCNC: 1.16 MG/DL (ref 0.57–1)
CRP SERPL-MCNC: <0.3 MG/DL (ref 0–0.5)
D DIMER PPP FEU-MCNC: 0.54 MCGFEU/ML (ref 0–0.5)
DEPRECATED RDW RBC AUTO: 43.4 FL (ref 37–54)
EOSINOPHIL # BLD AUTO: 0.06 10*3/MM3 (ref 0–0.4)
EOSINOPHIL NFR BLD AUTO: 0.6 % (ref 0.3–6.2)
ERYTHROCYTE [DISTWIDTH] IN BLOOD BY AUTOMATED COUNT: 12.5 % (ref 12.3–15.4)
GFR SERPL CREATININE-BSD FRML MDRD: 51 ML/MIN/1.73
GLOBULIN UR ELPH-MCNC: 2.7 GM/DL
GLUCOSE SERPL-MCNC: 108 MG/DL (ref 65–99)
GLUCOSE UR STRIP-MCNC: NEGATIVE MG/DL
HCT VFR BLD AUTO: 37.8 % (ref 34–46.6)
HGB BLD-MCNC: 12.5 G/DL (ref 12–15.9)
HGB UR QL STRIP.AUTO: NEGATIVE
HOLD SPECIMEN: NORMAL
HOLD SPECIMEN: NORMAL
HYALINE CASTS UR QL AUTO: ABNORMAL /LPF
IMM GRANULOCYTES # BLD AUTO: 0.03 10*3/MM3 (ref 0–0.05)
IMM GRANULOCYTES NFR BLD AUTO: 0.3 % (ref 0–0.5)
KETONES UR QL STRIP: ABNORMAL
LEUKOCYTE ESTERASE UR QL STRIP.AUTO: NEGATIVE
LYMPHOCYTES # BLD AUTO: 2.69 10*3/MM3 (ref 0.7–3.1)
LYMPHOCYTES NFR BLD AUTO: 28 % (ref 19.6–45.3)
MCH RBC QN AUTO: 31.2 PG (ref 26.6–33)
MCHC RBC AUTO-ENTMCNC: 33.1 G/DL (ref 31.5–35.7)
MCV RBC AUTO: 94.3 FL (ref 79–97)
MONOCYTES # BLD AUTO: 0.92 10*3/MM3 (ref 0.1–0.9)
MONOCYTES NFR BLD AUTO: 9.6 % (ref 5–12)
NEUTROPHILS NFR BLD AUTO: 5.89 10*3/MM3 (ref 1.7–7)
NEUTROPHILS NFR BLD AUTO: 61.2 % (ref 42.7–76)
NITRITE UR QL STRIP: NEGATIVE
NRBC BLD AUTO-RTO: 0 /100 WBC (ref 0–0.2)
NT-PROBNP SERPL-MCNC: 117.8 PG/ML (ref 0–450)
PH UR STRIP.AUTO: 5.5 [PH] (ref 5–8)
PLATELET # BLD AUTO: 195 10*3/MM3 (ref 140–450)
PMV BLD AUTO: 10 FL (ref 6–12)
POTASSIUM SERPL-SCNC: 3.5 MMOL/L (ref 3.5–5.2)
PROT SERPL-MCNC: 7 G/DL (ref 6–8.5)
PROT UR QL STRIP: ABNORMAL
RBC # BLD AUTO: 4.01 10*6/MM3 (ref 3.77–5.28)
RBC # UR: ABNORMAL /HPF
REF LAB TEST METHOD: ABNORMAL
SODIUM SERPL-SCNC: 142 MMOL/L (ref 136–145)
SP GR UR STRIP: >1.03 (ref 1–1.03)
SQUAMOUS #/AREA URNS HPF: ABNORMAL /HPF
TROPONIN T SERPL-MCNC: <0.01 NG/ML (ref 0–0.03)
TROPONIN T SERPL-MCNC: <0.01 NG/ML (ref 0–0.03)
UROBILINOGEN UR QL STRIP: ABNORMAL
WBC # BLD AUTO: 9.62 10*3/MM3 (ref 3.4–10.8)
WBC UR QL AUTO: ABNORMAL /HPF
WHOLE BLOOD HOLD SPECIMEN: NORMAL
WHOLE BLOOD HOLD SPECIMEN: NORMAL

## 2021-09-23 PROCEDURE — 93010 ELECTROCARDIOGRAM REPORT: CPT | Performed by: INTERNAL MEDICINE

## 2021-09-23 PROCEDURE — 93005 ELECTROCARDIOGRAM TRACING: CPT | Performed by: PHYSICIAN ASSISTANT

## 2021-09-23 PROCEDURE — 80053 COMPREHEN METABOLIC PANEL: CPT | Performed by: PHYSICIAN ASSISTANT

## 2021-09-23 PROCEDURE — 71275 CT ANGIOGRAPHY CHEST: CPT

## 2021-09-23 PROCEDURE — 93005 ELECTROCARDIOGRAM TRACING: CPT | Performed by: STUDENT IN AN ORGANIZED HEALTH CARE EDUCATION/TRAINING PROGRAM

## 2021-09-23 PROCEDURE — 84484 ASSAY OF TROPONIN QUANT: CPT | Performed by: PHYSICIAN ASSISTANT

## 2021-09-23 PROCEDURE — 83880 ASSAY OF NATRIURETIC PEPTIDE: CPT | Performed by: PHYSICIAN ASSISTANT

## 2021-09-23 PROCEDURE — 71045 X-RAY EXAM CHEST 1 VIEW: CPT

## 2021-09-23 PROCEDURE — 86140 C-REACTIVE PROTEIN: CPT | Performed by: PHYSICIAN ASSISTANT

## 2021-09-23 PROCEDURE — 0 IOPAMIDOL PER 1 ML: Performed by: EMERGENCY MEDICINE

## 2021-09-23 PROCEDURE — 99283 EMERGENCY DEPT VISIT LOW MDM: CPT

## 2021-09-23 PROCEDURE — 85379 FIBRIN DEGRADATION QUANT: CPT | Performed by: EMERGENCY MEDICINE

## 2021-09-23 PROCEDURE — 85025 COMPLETE CBC W/AUTO DIFF WBC: CPT | Performed by: PHYSICIAN ASSISTANT

## 2021-09-23 PROCEDURE — 36415 COLL VENOUS BLD VENIPUNCTURE: CPT

## 2021-09-23 PROCEDURE — 81001 URINALYSIS AUTO W/SCOPE: CPT | Performed by: PHYSICIAN ASSISTANT

## 2021-09-23 PROCEDURE — 93005 ELECTROCARDIOGRAM TRACING: CPT | Performed by: EMERGENCY MEDICINE

## 2021-09-23 RX ORDER — ASPIRIN 81 MG/1
324 TABLET, CHEWABLE ORAL ONCE
Status: COMPLETED | OUTPATIENT
Start: 2021-09-23 | End: 2021-09-23

## 2021-09-23 RX ORDER — SODIUM CHLORIDE 0.9 % (FLUSH) 0.9 %
10 SYRINGE (ML) INJECTION AS NEEDED
Status: DISCONTINUED | OUTPATIENT
Start: 2021-09-23 | End: 2021-09-24 | Stop reason: HOSPADM

## 2021-09-23 RX ADMIN — ASPIRIN 324 MG: 81 TABLET, CHEWABLE ORAL at 23:13

## 2021-09-23 RX ADMIN — SODIUM CHLORIDE 1000 ML: 9 INJECTION, SOLUTION INTRAVENOUS at 23:13

## 2021-09-23 RX ADMIN — IOPAMIDOL 80 ML: 755 INJECTION, SOLUTION INTRAVENOUS at 23:30

## 2021-09-23 NOTE — ED NOTES
MEDICAL SCREENING:    Reason for Visit: Covid + on 9/8. Patient states that she is out of quarantine and continues to have CP and SOB. PCP wanted her to come to the ED to rule out a blood clot.     Patient initially seen in triage.  The patient was advised further evaluation and diagnostic testing will be needed, some of the treatment and testing will be initiated in the lobby in order to begin the process.  The patient will be returned to the waiting area for the time being and possibly be re-assessed by a subsequent ED provider.  The patient will be brought back to the treatment area in as timely manner as possible.        Patty Ha PA  09/23/21 1946

## 2021-09-24 VITALS
BODY MASS INDEX: 23.49 KG/M2 | HEIGHT: 65 IN | TEMPERATURE: 98.5 F | WEIGHT: 141 LBS | RESPIRATION RATE: 18 BRPM | SYSTOLIC BLOOD PRESSURE: 125 MMHG | DIASTOLIC BLOOD PRESSURE: 79 MMHG | HEART RATE: 82 BPM | OXYGEN SATURATION: 98 %

## 2021-09-24 LAB
QT INTERVAL: 384 MS
QTC INTERVAL: 432 MS
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
## 2021-10-18 ENCOUNTER — HOSPITAL ENCOUNTER (OUTPATIENT)
Dept: HOSPITAL 79 - RT | Age: 41
End: 2021-10-18
Attending: NURSE PRACTITIONER
Payer: COMMERCIAL

## 2021-10-18 DIAGNOSIS — R07.9: Primary | ICD-10-CM

## 2021-11-01 ENCOUNTER — HOSPITAL ENCOUNTER (OUTPATIENT)
Dept: HOSPITAL 79 - KOH-I | Age: 41
End: 2021-11-01
Attending: NURSE PRACTITIONER
Payer: COMMERCIAL

## 2021-11-01 DIAGNOSIS — R05.9: Primary | ICD-10-CM

## 2021-12-15 ENCOUNTER — HOSPITAL ENCOUNTER (OUTPATIENT)
Dept: HOSPITAL 79 - KOH-I | Age: 41
End: 2021-12-15
Attending: NURSE PRACTITIONER
Payer: COMMERCIAL

## 2021-12-15 DIAGNOSIS — J01.90: Primary | ICD-10-CM

## 2024-01-22 ENCOUNTER — TRANSCRIBE ORDERS (OUTPATIENT)
Dept: LAB | Facility: HOSPITAL | Age: 44
End: 2024-01-22
Payer: COMMERCIAL

## 2024-01-22 ENCOUNTER — HOSPITAL ENCOUNTER (OUTPATIENT)
Dept: ULTRASOUND IMAGING | Facility: HOSPITAL | Age: 44
Discharge: HOME | End: 2024-01-22
Admitting: NURSE PRACTITIONER
Payer: COMMERCIAL

## 2024-01-22 DIAGNOSIS — N17.9 ACUTE RENAL FAILURE, UNSPECIFIED ACUTE RENAL FAILURE TYPE: Primary | ICD-10-CM

## 2024-01-22 DIAGNOSIS — N17.9 ACUTE RENAL FAILURE, UNSPECIFIED ACUTE RENAL FAILURE TYPE: ICD-10-CM

## 2024-01-22 PROCEDURE — 76775 US EXAM ABDO BACK WALL LIM: CPT | Performed by: RADIOLOGY

## 2024-01-22 PROCEDURE — 76775 US EXAM ABDO BACK WALL LIM: CPT
